# Patient Record
Sex: MALE | Race: WHITE | NOT HISPANIC OR LATINO | URBAN - METROPOLITAN AREA
[De-identification: names, ages, dates, MRNs, and addresses within clinical notes are randomized per-mention and may not be internally consistent; named-entity substitution may affect disease eponyms.]

---

## 2019-10-26 ENCOUNTER — INPATIENT (INPATIENT)
Facility: HOSPITAL | Age: 74
LOS: 1 days | Discharge: HOME | DRG: 66 | End: 2019-10-28
Attending: PSYCHIATRY & NEUROLOGY | Admitting: PSYCHIATRY & NEUROLOGY
Payer: MEDICARE

## 2019-10-26 VITALS
TEMPERATURE: 98 F | HEART RATE: 74 BPM | OXYGEN SATURATION: 96 % | RESPIRATION RATE: 18 BRPM | SYSTOLIC BLOOD PRESSURE: 161 MMHG | WEIGHT: 184.31 LBS | HEIGHT: 68 IN | DIASTOLIC BLOOD PRESSURE: 95 MMHG

## 2019-10-26 DIAGNOSIS — R63.8 OTHER SYMPTOMS AND SIGNS CONCERNING FOOD AND FLUID INTAKE: ICD-10-CM

## 2019-10-26 DIAGNOSIS — N40.0 BENIGN PROSTATIC HYPERPLASIA WITHOUT LOWER URINARY TRACT SYMPTOMS: ICD-10-CM

## 2019-10-26 DIAGNOSIS — Z91.89 OTHER SPECIFIED PERSONAL RISK FACTORS, NOT ELSEWHERE CLASSIFIED: ICD-10-CM

## 2019-10-26 DIAGNOSIS — I63.9 CEREBRAL INFARCTION, UNSPECIFIED: ICD-10-CM

## 2019-10-26 DIAGNOSIS — R73.03 PREDIABETES: ICD-10-CM

## 2019-10-26 LAB
ALBUMIN SERPL ELPH-MCNC: 4.3 G/DL — SIGNIFICANT CHANGE UP (ref 3.3–5)
ALP SERPL-CCNC: 45 U/L — SIGNIFICANT CHANGE UP (ref 40–120)
ALT FLD-CCNC: 33 U/L — SIGNIFICANT CHANGE UP (ref 10–45)
ANION GAP SERPL CALC-SCNC: 10 MMOL/L — SIGNIFICANT CHANGE UP (ref 5–17)
APPEARANCE UR: CLEAR — SIGNIFICANT CHANGE UP
APTT BLD: 30.1 SEC — SIGNIFICANT CHANGE UP (ref 27.5–36.3)
AST SERPL-CCNC: 25 U/L — SIGNIFICANT CHANGE UP (ref 10–40)
BASOPHILS # BLD AUTO: 0.04 K/UL — SIGNIFICANT CHANGE UP (ref 0–0.2)
BASOPHILS NFR BLD AUTO: 0.5 % — SIGNIFICANT CHANGE UP (ref 0–2)
BILIRUB SERPL-MCNC: 0.5 MG/DL — SIGNIFICANT CHANGE UP (ref 0.2–1.2)
BILIRUB UR-MCNC: NEGATIVE — SIGNIFICANT CHANGE UP
BUN SERPL-MCNC: 15 MG/DL — SIGNIFICANT CHANGE UP (ref 7–23)
CALCIUM SERPL-MCNC: 9.1 MG/DL — SIGNIFICANT CHANGE UP (ref 8.4–10.5)
CHLORIDE SERPL-SCNC: 104 MMOL/L — SIGNIFICANT CHANGE UP (ref 96–108)
CO2 SERPL-SCNC: 28 MMOL/L — SIGNIFICANT CHANGE UP (ref 22–31)
COLOR SPEC: YELLOW — SIGNIFICANT CHANGE UP
CREAT SERPL-MCNC: 1.06 MG/DL — SIGNIFICANT CHANGE UP (ref 0.5–1.3)
DIFF PNL FLD: ABNORMAL
EOSINOPHIL # BLD AUTO: 0.2 K/UL — SIGNIFICANT CHANGE UP (ref 0–0.5)
EOSINOPHIL NFR BLD AUTO: 2.3 % — SIGNIFICANT CHANGE UP (ref 0–6)
GLUCOSE SERPL-MCNC: 110 MG/DL — HIGH (ref 70–99)
GLUCOSE UR QL: NEGATIVE — SIGNIFICANT CHANGE UP
HCT VFR BLD CALC: 44 % — SIGNIFICANT CHANGE UP (ref 39–50)
HGB BLD-MCNC: 13.8 G/DL — SIGNIFICANT CHANGE UP (ref 13–17)
IMM GRANULOCYTES NFR BLD AUTO: 0.5 % — SIGNIFICANT CHANGE UP (ref 0–1.5)
INR BLD: 1.07 — SIGNIFICANT CHANGE UP (ref 0.88–1.16)
KETONES UR-MCNC: NEGATIVE — SIGNIFICANT CHANGE UP
LEUKOCYTE ESTERASE UR-ACNC: NEGATIVE — SIGNIFICANT CHANGE UP
LYMPHOCYTES # BLD AUTO: 2.3 K/UL — SIGNIFICANT CHANGE UP (ref 1–3.3)
LYMPHOCYTES # BLD AUTO: 26.2 % — SIGNIFICANT CHANGE UP (ref 13–44)
MCHC RBC-ENTMCNC: 31.2 PG — SIGNIFICANT CHANGE UP (ref 27–34)
MCHC RBC-ENTMCNC: 31.4 GM/DL — LOW (ref 32–36)
MCV RBC AUTO: 99.3 FL — SIGNIFICANT CHANGE UP (ref 80–100)
MONOCYTES # BLD AUTO: 0.75 K/UL — SIGNIFICANT CHANGE UP (ref 0–0.9)
MONOCYTES NFR BLD AUTO: 8.6 % — SIGNIFICANT CHANGE UP (ref 2–14)
NEUTROPHILS # BLD AUTO: 5.44 K/UL — SIGNIFICANT CHANGE UP (ref 1.8–7.4)
NEUTROPHILS NFR BLD AUTO: 61.9 % — SIGNIFICANT CHANGE UP (ref 43–77)
NITRITE UR-MCNC: NEGATIVE — SIGNIFICANT CHANGE UP
NRBC # BLD: 0 /100 WBCS — SIGNIFICANT CHANGE UP (ref 0–0)
PH UR: 6.5 — SIGNIFICANT CHANGE UP (ref 5–8)
PLATELET # BLD AUTO: 234 K/UL — SIGNIFICANT CHANGE UP (ref 150–400)
POTASSIUM SERPL-MCNC: 4 MMOL/L — SIGNIFICANT CHANGE UP (ref 3.5–5.3)
POTASSIUM SERPL-SCNC: 4 MMOL/L — SIGNIFICANT CHANGE UP (ref 3.5–5.3)
PROT SERPL-MCNC: 7.1 G/DL — SIGNIFICANT CHANGE UP (ref 6–8.3)
PROT UR-MCNC: NEGATIVE MG/DL — SIGNIFICANT CHANGE UP
PROTHROM AB SERPL-ACNC: 12.1 SEC — SIGNIFICANT CHANGE UP (ref 10–12.9)
RBC # BLD: 4.43 M/UL — SIGNIFICANT CHANGE UP (ref 4.2–5.8)
RBC # FLD: 12.5 % — SIGNIFICANT CHANGE UP (ref 10.3–14.5)
SODIUM SERPL-SCNC: 142 MMOL/L — SIGNIFICANT CHANGE UP (ref 135–145)
SP GR SPEC: 1.01 — SIGNIFICANT CHANGE UP (ref 1–1.03)
TROPONIN T SERPL-MCNC: <0.01 NG/ML — SIGNIFICANT CHANGE UP (ref 0–0.01)
UROBILINOGEN FLD QL: 0.2 E.U./DL — SIGNIFICANT CHANGE UP
WBC # BLD: 8.77 K/UL — SIGNIFICANT CHANGE UP (ref 3.8–10.5)
WBC # FLD AUTO: 8.77 K/UL — SIGNIFICANT CHANGE UP (ref 3.8–10.5)

## 2019-10-26 PROCEDURE — 99291 CRITICAL CARE FIRST HOUR: CPT

## 2019-10-26 PROCEDURE — 70496 CT ANGIOGRAPHY HEAD: CPT | Mod: 26

## 2019-10-26 PROCEDURE — 99223 1ST HOSP IP/OBS HIGH 75: CPT

## 2019-10-26 PROCEDURE — 70498 CT ANGIOGRAPHY NECK: CPT | Mod: 26

## 2019-10-26 PROCEDURE — 70450 CT HEAD/BRAIN W/O DYE: CPT | Mod: 26,59

## 2019-10-26 PROCEDURE — 0042T: CPT

## 2019-10-26 PROCEDURE — 93010 ELECTROCARDIOGRAM REPORT: CPT

## 2019-10-26 RX ORDER — ASPIRIN/CALCIUM CARB/MAGNESIUM 324 MG
81 TABLET ORAL DAILY
Refills: 0 | Status: DISCONTINUED | OUTPATIENT
Start: 2019-10-26 | End: 2019-10-28

## 2019-10-26 RX ORDER — TAMSULOSIN HYDROCHLORIDE 0.4 MG/1
1 CAPSULE ORAL
Qty: 0 | Refills: 0 | DISCHARGE

## 2019-10-26 RX ORDER — ENOXAPARIN SODIUM 100 MG/ML
40 INJECTION SUBCUTANEOUS EVERY 24 HOURS
Refills: 0 | Status: DISCONTINUED | OUTPATIENT
Start: 2019-10-26 | End: 2019-10-28

## 2019-10-26 RX ORDER — FINASTERIDE 5 MG/1
1 TABLET, FILM COATED ORAL
Qty: 0 | Refills: 0 | DISCHARGE

## 2019-10-26 RX ORDER — PANTOPRAZOLE SODIUM 20 MG/1
40 TABLET, DELAYED RELEASE ORAL
Refills: 0 | Status: DISCONTINUED | OUTPATIENT
Start: 2019-10-26 | End: 2019-10-28

## 2019-10-26 RX ORDER — INFLUENZA VIRUS VACCINE 15; 15; 15; 15 UG/.5ML; UG/.5ML; UG/.5ML; UG/.5ML
0.5 SUSPENSION INTRAMUSCULAR ONCE
Refills: 0 | Status: COMPLETED | OUTPATIENT
Start: 2019-10-26 | End: 2019-10-28

## 2019-10-26 RX ORDER — CLOPIDOGREL BISULFATE 75 MG/1
75 TABLET, FILM COATED ORAL ONCE
Refills: 0 | Status: COMPLETED | OUTPATIENT
Start: 2019-10-26 | End: 2019-10-26

## 2019-10-26 RX ORDER — OMEPRAZOLE 10 MG/1
1 CAPSULE, DELAYED RELEASE ORAL
Qty: 0 | Refills: 0 | DISCHARGE

## 2019-10-26 RX ORDER — ATORVASTATIN CALCIUM 80 MG/1
40 TABLET, FILM COATED ORAL AT BEDTIME
Refills: 0 | Status: DISCONTINUED | OUTPATIENT
Start: 2019-10-26 | End: 2019-10-27

## 2019-10-26 RX ORDER — TAMSULOSIN HYDROCHLORIDE 0.4 MG/1
0.4 CAPSULE ORAL AT BEDTIME
Refills: 0 | Status: DISCONTINUED | OUTPATIENT
Start: 2019-10-26 | End: 2019-10-28

## 2019-10-26 RX ORDER — FINASTERIDE 5 MG/1
5 TABLET, FILM COATED ORAL DAILY
Refills: 0 | Status: DISCONTINUED | OUTPATIENT
Start: 2019-10-26 | End: 2019-10-27

## 2019-10-26 RX ORDER — TAMSULOSIN HYDROCHLORIDE 0.4 MG/1
0 CAPSULE ORAL
Qty: 0 | Refills: 0 | DISCHARGE

## 2019-10-26 RX ADMIN — CLOPIDOGREL BISULFATE 75 MILLIGRAM(S): 75 TABLET, FILM COATED ORAL at 14:52

## 2019-10-26 RX ADMIN — Medication 81 MILLIGRAM(S): at 14:52

## 2019-10-26 RX ADMIN — ATORVASTATIN CALCIUM 40 MILLIGRAM(S): 80 TABLET, FILM COATED ORAL at 21:19

## 2019-10-26 RX ADMIN — TAMSULOSIN HYDROCHLORIDE 0.4 MILLIGRAM(S): 0.4 CAPSULE ORAL at 21:19

## 2019-10-26 NOTE — PATIENT PROFILE ADULT - STATED REASON FOR ADMISSION
Woke up this morning, around 10:30 am noticed a numbness on L side of head that radiated to neck. Numbness was also present to L big toe

## 2019-10-26 NOTE — ED PROVIDER NOTE - PHYSICAL EXAMINATION
CONSTITUTIONAL: Well appearing, well nourished, awake, alert and in no apparent distress.  HEENT: Head is atraumatic. Eyes clear bilaterally, normal EOMI. Airway patent.  CARDIAC: Normal rate, regular rhythm.  Heart sounds S1, S2.   RESPIRATORY: Breath sounds clear and equal bilaterally. no tachypnea, respiratory distress.   GASTROINTESTINAL: Abdomen soft, non-tender, no guarding, distension.  MUSCULOSKELETAL: Spine appears normal, no midline spinal tenderness, range of motion is not limited, no muscle or joint tenderness. no bony tenderness. no JVD, peripheral edema.   NEUROLOGICAL: Alert and oriented  SKIN: Skin normal color for race, warm, dry and intact. No evidence of rash.  PSYCHIATRIC: Alert and oriented to person, place, time/situation. normal mood and affect. no apparent risk to self or others.

## 2019-10-26 NOTE — H&P ADULT - NSHPLABSRESULTS_GEN_ALL_CORE
.  LABS:                         13.8   8.77  )-----------( 234      ( 26 Oct 2019 12:17 )             44.0     10-26    142  |  104  |  15  ----------------------------<  110<H>  4.0   |  28  |  1.06    Ca    9.1      26 Oct 2019 12:17    TPro  7.1  /  Alb  4.3  /  TBili  0.5  /  DBili  x   /  AST  25  /  ALT  33  /  AlkPhos  45  10    PT/INR - ( 26 Oct 2019 12:17 )   PT: 12.1 sec;   INR: 1.07          PTT - ( 26 Oct 2019 12:17 )  PTT:30.1 sec  Urinalysis Basic - ( 26 Oct 2019 13:00 )    Color: Yellow / Appearance: Clear / S.010 / pH: x  Gluc: x / Ketone: NEGATIVE  / Bili: Negative / Urobili: 0.2 E.U./dL   Blood: x / Protein: NEGATIVE mg/dL / Nitrite: NEGATIVE   Leuk Esterase: NEGATIVE / RBC: 5-10 /HPF / WBC < 5 /HPF   Sq Epi: x / Non Sq Epi: 0-5 /HPF / Bacteria: None /HPF      CARDIAC MARKERS ( 26 Oct 2019 12:17 )  x     / <0.01 ng/mL / x     / x     / x                RADIOLOGY, EKG & ADDITIONAL TESTS: Reviewed.

## 2019-10-26 NOTE — H&P ADULT - HISTORY OF PRESENT ILLNESS
73 y/o M w/ hx of BPH and FH of strokes coming in with complaints of L sided ear numbness. He states that starting around 10:30am when he went to answer his phone, he noticed his ear was numb. He then also felt as if his L great toe was numb as well. He denied weakness, dizziness/lightheadedness, HA, CP, palpitations, SOB, abdominal pain, N/V/D/C, dysuria/hematuria. On admission, vitals T-97.6, HR-74, BP-161/95, RR-18, SpO2-96% on RA. Stroke code was called. NIHSS 1 for sensation deficits (patient noting sensation deficits on L side of face V1-V3 and L leg 85% of normal). CTH, CTA head and neck and CTP negative for acute infarct, hemorrhage or occlusion. CT neck w/ multilevel cervical spondylosis.

## 2019-10-26 NOTE — CONSULT NOTE ADULT - SUBJECTIVE AND OBJECTIVE BOX
**STROKE CODE CONSULT NOTE**    Last known well time/Time of onset of symptoms:    HPI:    PAST MEDICAL & SURGICAL HISTORY:  BPH (benign prostatic hyperplasia)      FAMILY HISTORY:      SOCIAL HISTORY:  Smoking Cesation: Discussed    ROS:  Constitutional: No fever, weight loss or fatigue  Eyes: No eye pain, visual disturbances, or discharge  ENMT:  No difficulty hearing, tinnitus, vertigo; No sinus or throat pain  Neck: No pain or stiffness  Respiratory: No cough, wheezing, chills or hemoptysis  Cardiovascular: No chest pain, palpitations, shortness of breath, dizziness or leg swelling  Gastrointestinal: No abdominal pain. No nausea, vomiting or hematemesis; No diarrhea or constipation. Nohematochezia.  Genitourinary: No dysuria, frequency, hematuria or incontinence  Neurological: As per HPI  Skin: No itching, burning, rashes or lesions   Endocrine: No heat or cold intolerance; No hair loss  Musculoskeletal: No joint pain or swelling; No muscle, back or extremity pain  Psychiatric: No depression, anxiety, mood swings or difficulty sleeping  Heme/Lymph: No easy bruising or bleeding gums    MEDICATIONS  (STANDING):  aspirin enteric coated 81 milliGRAM(s) Oral daily  atorvastatin 40 milliGRAM(s) Oral at bedtime  enoxaparin Injectable 40 milliGRAM(s) SubCutaneous every 24 hours  finasteride 5 milliGRAM(s) Oral daily  influenza   Vaccine 0.5 milliLiter(s) IntraMuscular once  pantoprazole    Tablet 40 milliGRAM(s) Oral before breakfast  tamsulosin 0.4 milliGRAM(s) Oral at bedtime    MEDICATIONS  (PRN):      Allergies    sulfa drugs (Other)    Intolerances    lactose (Diarrhea)      Vital Signs Last 24 Hrs  T(C): 36.8 (26 Oct 2019 18:00), Max: 36.8 (26 Oct 2019 18:00)  T(F): 98.3 (26 Oct 2019 18:00), Max: 98.3 (26 Oct 2019 18:00)  HR: 64 (26 Oct 2019 16:30) (54 - 74)  BP: 152/82 (26 Oct 2019 16:30) (126/82 - 161/95)  BP(mean): 111 (26 Oct 2019 16:30) (111 - 111)  RR: 16 (26 Oct 2019 16:30) (16 - 18)  SpO2: 95% (26 Oct 2019 16:30) (95% - 97%)    PHYSICAL EXAM:  Constitutional: WDWN; NAD  Cardiovascular: RRR, no appreciable murmurs; no carotid bruits  Neurologic:  Mental status: Awake, alert and oriented x3.  Recent and remote memory intact.  Naming, repetition and comprehension intact.  Attention/concentration intact.  No dysarthria, no aphasia.  Fund of knowledge appropriate.    Cranial nerves: Pupils equally round and reactive to light, visual fields full, no nystagmus, extraocular muscles intact, V1 through V3 intact bilaterally and symmetric, face symmetric, hearing intact to finger rub, palate elevation symmetric, tongue was midline, sternocleidomastoid/shoulder shrug strength bilaterally 5/5.    Motor:  Normal bulk and tone, strength 5/5 in bilateral upper and lower extremities.   strength 5/5.  Rapid alternating movements intact and symmetric.   Sensation: Intact to light touch, proprioception, and pinprick.  No neglect.   Coordination: No dysmetria on finger-to-nose and heel-to-shin.  No clumsiness.  Reflexes: 2+ in upper and lower extremities, downgoing toes bilaterally  Gait: Narrow and steady. No ataxia.  Romberg negative    NIHSS:    Fingerstick Blood Glucose: CAPILLARY BLOOD GLUCOSE  109 (26 Oct 2019 13:24)      POCT Blood Glucose.: 109 mg/dL (26 Oct 2019 11:33)       LABS:                        13.8   8.77  )-----------( 234      ( 26 Oct 2019 12:17 )             44.0     10-26    142  |  104  |  15  ----------------------------<  110<H>  4.0   |  28  |  1.06    Ca    9.1      26 Oct 2019 12:17    TPro  7.1  /  Alb  4.3  /  TBili  0.5  /  DBili  x   /  AST  25  /  ALT  33  /  AlkPhos  45  10    PT/INR - ( 26 Oct 2019 12:17 )   PT: 12.1 sec;   INR: 1.07          PTT - ( 26 Oct 2019 12:17 )  PTT:30.1 sec  CARDIAC MARKERS ( 26 Oct 2019 12:17 )  x     / <0.01 ng/mL / x     / x     / x          Urinalysis Basic - ( 26 Oct 2019 13:00 )    Color: Yellow / Appearance: Clear / S.010 / pH: x  Gluc: x / Ketone: NEGATIVE  / Bili: Negative / Urobili: 0.2 E.U./dL   Blood: x / Protein: NEGATIVE mg/dL / Nitrite: NEGATIVE   Leuk Esterase: NEGATIVE / RBC: 5-10 /HPF / WBC < 5 /HPF   Sq Epi: x / Non Sq Epi: 0-5 /HPF / Bacteria: None /HPF        RADIOLOGY & ADDITIONAL STUDIES:    IV-tPA (Y/N):                                   Bolus time:  Reason IV-tPA not given:    ASSESSMENT/PLAN: **STROKE CODE CONSULT NOTE**    Last known well time/Time of onset of symptoms: 10:30     HPI:  75 y/o M w/ hx of BPH and FH of strokes coming in with complaints of     PAST MEDICAL & SURGICAL HISTORY:  BPH (benign prostatic hyperplasia)      FAMILY HISTORY:      SOCIAL HISTORY:  Smoking Cesation: Discussed    ROS:  Constitutional: No fever, weight loss or fatigue  Eyes: No eye pain, visual disturbances, or discharge  ENMT:  No difficulty hearing, tinnitus, vertigo; No sinus or throat pain  Neck: No pain or stiffness  Respiratory: No cough, wheezing, chills or hemoptysis  Cardiovascular: No chest pain, palpitations, shortness of breath, dizziness or leg swelling  Gastrointestinal: No abdominal pain. No nausea, vomiting or hematemesis; No diarrhea or constipation. Nohematochezia.  Genitourinary: No dysuria, frequency, hematuria or incontinence  Neurological: As per HPI  Skin: No itching, burning, rashes or lesions   Endocrine: No heat or cold intolerance; No hair loss  Musculoskeletal: No joint pain or swelling; No muscle, back or extremity pain  Psychiatric: No depression, anxiety, mood swings or difficulty sleeping  Heme/Lymph: No easy bruising or bleeding gums    MEDICATIONS  (STANDING):  aspirin enteric coated 81 milliGRAM(s) Oral daily  atorvastatin 40 milliGRAM(s) Oral at bedtime  enoxaparin Injectable 40 milliGRAM(s) SubCutaneous every 24 hours  finasteride 5 milliGRAM(s) Oral daily  influenza   Vaccine 0.5 milliLiter(s) IntraMuscular once  pantoprazole    Tablet 40 milliGRAM(s) Oral before breakfast  tamsulosin 0.4 milliGRAM(s) Oral at bedtime    MEDICATIONS  (PRN):      Allergies    sulfa drugs (Other)    Intolerances    lactose (Diarrhea)      Vital Signs Last 24 Hrs  T(C): 36.8 (26 Oct 2019 18:00), Max: 36.8 (26 Oct 2019 18:00)  T(F): 98.3 (26 Oct 2019 18:00), Max: 98.3 (26 Oct 2019 18:00)  HR: 64 (26 Oct 2019 16:30) (54 - 74)  BP: 152/82 (26 Oct 2019 16:30) (126/82 - 161/95)  BP(mean): 111 (26 Oct 2019 16:30) (111 - 111)  RR: 16 (26 Oct 2019 16:30) (16 - 18)  SpO2: 95% (26 Oct 2019 16:30) (95% - 97%)    PHYSICAL EXAM:  Constitutional: WDWN; NAD  Cardiovascular: RRR, no appreciable murmurs; no carotid bruits  Neurologic:  Mental status: Awake, alert and oriented x3.  Recent and remote memory intact.  Naming, repetition and comprehension intact.  Attention/concentration intact.  No dysarthria, no aphasia.  Fund of knowledge appropriate.    Cranial nerves: Pupils equally round and reactive to light, visual fields full, no nystagmus, extraocular muscles intact, V1 through V3 intact bilaterally and symmetric, face symmetric, hearing intact to finger rub, palate elevation symmetric, tongue was midline, sternocleidomastoid/shoulder shrug strength bilaterally 5/5.    Motor:  Normal bulk and tone, strength 5/5 in bilateral upper and lower extremities.   strength 5/5.  Rapid alternating movements intact and symmetric.   Sensation: Intact to light touch, proprioception, and pinprick.  No neglect.   Coordination: No dysmetria on finger-to-nose and heel-to-shin.  No clumsiness.  Reflexes: 2+ in upper and lower extremities, downgoing toes bilaterally  Gait: Narrow and steady. No ataxia.  Romberg negative    NIHSS:    Fingerstick Blood Glucose: CAPILLARY BLOOD GLUCOSE  109 (26 Oct 2019 13:24)      POCT Blood Glucose.: 109 mg/dL (26 Oct 2019 11:33)       LABS:                        13.8   8.77  )-----------( 234      ( 26 Oct 2019 12:17 )             44.0     10-26    142  |  104  |  15  ----------------------------<  110<H>  4.0   |  28  |  1.06    Ca    9.1      26 Oct 2019 12:17    TPro  7.1  /  Alb  4.3  /  TBili  0.5  /  DBili  x   /  AST  25  /  ALT  33  /  AlkPhos  45  10    PT/INR - ( 26 Oct 2019 12:17 )   PT: 12.1 sec;   INR: 1.07          PTT - ( 26 Oct 2019 12:17 )  PTT:30.1 sec  CARDIAC MARKERS ( 26 Oct 2019 12:17 )  x     / <0.01 ng/mL / x     / x     / x          Urinalysis Basic - ( 26 Oct 2019 13:00 )    Color: Yellow / Appearance: Clear / S.010 / pH: x  Gluc: x / Ketone: NEGATIVE  / Bili: Negative / Urobili: 0.2 E.U./dL   Blood: x / Protein: NEGATIVE mg/dL / Nitrite: NEGATIVE   Leuk Esterase: NEGATIVE / RBC: 5-10 /HPF / WBC < 5 /HPF   Sq Epi: x / Non Sq Epi: 0-5 /HPF / Bacteria: None /HPF        RADIOLOGY & ADDITIONAL STUDIES:    IV-tPA (Y/N):                                   Bolus time:  Reason IV-tPA not given:    ASSESSMENT/PLAN: **STROKE CODE CONSULT NOTE**    Last known well time/Time of onset of symptoms: 10:30     HPI:  75 y/o M w/ hx of BPH and FH of strokes coming in with complaints of L sided ear numbness. He states that starting around 10:30am when he went to answer his phone, he noticed his ear was numb. He then also felt as if his L great toe was numb as well. He denied weakness, dizziness/lightheadedness, HA, CP, palpitations, SOB, abdominal pain, N/V/D/C, dysuria/hematuria. On admission, vitals T-97.6, HR-74, BP-161/95, RR-18, SpO2-96% on RA. Stroke code was called. NIHSS 1 for sensation deficits (patient noting sensation deficits on L side of face V1-V3 and L leg 85% of normal). CTH, CTA head and neck and CTP negative for acute infarct, hemorrhage or occlusion. CT neck w/ multilevel cervical spondylosis.     PAST MEDICAL & SURGICAL HISTORY:  BPH (benign prostatic hyperplasia)      FAMILY HISTORY:  CVA in grandparents    SOCIAL HISTORY:  Denies smoking, drinking or recreational drug use    ROS:  Constitutional: No fever, weight loss or fatigue  Eyes: No eye pain, visual disturbances, or discharge  ENMT:  No difficulty hearing, tinnitus, vertigo; No sinus or throat pain  Neck: No pain or stiffness  Respiratory: No cough, wheezing, chills or hemoptysis  Cardiovascular: No chest pain, palpitations, shortness of breath, dizziness or leg swelling  Gastrointestinal: No abdominal pain. No nausea, vomiting or hematemesis; No diarrhea or constipation. Nohematochezia.  Genitourinary: No dysuria, frequency, hematuria or incontinence  Neurological: As per HPI  Skin: No itching, burning, rashes or lesions   Endocrine: No heat or cold intolerance; No hair loss  Musculoskeletal: No joint pain or swelling; No muscle, back or extremity pain  Psychiatric: No depression, anxiety, mood swings or difficulty sleeping  Heme/Lymph: No easy bruising or bleeding gums    MEDICATIONS  (STANDING):  aspirin enteric coated 81 milliGRAM(s) Oral daily  atorvastatin 40 milliGRAM(s) Oral at bedtime  enoxaparin Injectable 40 milliGRAM(s) SubCutaneous every 24 hours  finasteride 5 milliGRAM(s) Oral daily  influenza   Vaccine 0.5 milliLiter(s) IntraMuscular once  pantoprazole    Tablet 40 milliGRAM(s) Oral before breakfast  tamsulosin 0.4 milliGRAM(s) Oral at bedtime    MEDICATIONS  (PRN):      Allergies    sulfa drugs (Other)    Intolerances    lactose (Diarrhea)      Vital Signs Last 24 Hrs  T(C): 36.8 (26 Oct 2019 18:00), Max: 36.8 (26 Oct 2019 18:00)  T(F): 98.3 (26 Oct 2019 18:00), Max: 98.3 (26 Oct 2019 18:00)  HR: 64 (26 Oct 2019 16:30) (54 - 74)  BP: 152/82 (26 Oct 2019 16:30) (126/82 - 161/95)  BP(mean): 111 (26 Oct 2019 16:30) (111 - 111)  RR: 16 (26 Oct 2019 16:30) (16 - 18)  SpO2: 95% (26 Oct 2019 16:30) (95% - 97%)    PHYSICAL EXAM:  Constitutional: WDWN; NAD  Cardiovascular: RRR, no appreciable murmurs; no carotid bruits  Ear exam: cerumen impaction B/L   Neurologic:  Mental status: Awake, alert and oriented x3.  Recent and remote memory intact.  Naming, repetition and comprehension intact.  Attention/concentration intact.  No dysarthria, no aphasia.  Fund of knowledge appropriate.    Cranial nerves: Pupils equally round and reactive to light, visual fields full, no nystagmus, extraocular muscles intact, V1 through V3 on L decreased sensation to touch, face symmetric, hearing intact to finger rub, palate elevation symmetric, tongue was midline, sternocleidomastoid/shoulder shrug strength bilaterally 5/5.    Motor:  Normal bulk and tone, strength 5/5 in bilateral upper and lower extremities.   strength 5/5.  Rapid alternating movements intact and symmetric.   Sensation: Intact to light touch, proprioception, and pinprick.  No neglect.   Coordination: No dysmetria on finger-to-nose and heel-to-shin.  No clumsiness.  Reflexes: 2+ in upper and lower extremities, downgoing toes bilaterally  Gait: Narrow and steady. No ataxia.  Romberg negative    NIHSS:    Fingerstick Blood Glucose: CAPILLARY BLOOD GLUCOSE  109 (26 Oct 2019 13:24)      POCT Blood Glucose.: 109 mg/dL (26 Oct 2019 11:33)       LABS:                        13.8   8.77  )-----------( 234      ( 26 Oct 2019 12:17 )             44.0     10-26    142  |  104  |  15  ----------------------------<  110<H>  4.0   |  28  |  1.06    Ca    9.1      26 Oct 2019 12:17    TPro  7.1  /  Alb  4.3  /  TBili  0.5  /  DBili  x   /  AST  25  /  ALT  33  /  AlkPhos  45  10-26    PT/INR - ( 26 Oct 2019 12:17 )   PT: 12.1 sec;   INR: 1.07          PTT - ( 26 Oct 2019 12:17 )  PTT:30.1 sec  CARDIAC MARKERS ( 26 Oct 2019 12:17 )  x     / <0.01 ng/mL / x     / x     / x          Urinalysis Basic - ( 26 Oct 2019 13:00 )    Color: Yellow / Appearance: Clear / S.010 / pH: x  Gluc: x / Ketone: NEGATIVE  / Bili: Negative / Urobili: 0.2 E.U./dL   Blood: x / Protein: NEGATIVE mg/dL / Nitrite: NEGATIVE   Leuk Esterase: NEGATIVE / RBC: 5-10 /HPF / WBC < 5 /HPF   Sq Epi: x / Non Sq Epi: 0-5 /HPF / Bacteria: None /HPF        RADIOLOGY & ADDITIONAL STUDIES:    IV-tPA (Y/N): No                                  Bolus time:  Reason IV-tPA not given: No obvious ischemia or LVO

## 2019-10-26 NOTE — ED PROVIDER NOTE - CLINICAL SUMMARY MEDICAL DECISION MAKING FREE TEXT BOX
pt w sx as above, concerning for ?cva vs peripheral neuropathy, given time onset, stroke code called. per pt sx in foot resolving while in ED.

## 2019-10-26 NOTE — H&P ADULT - PROBLEM SELECTOR PLAN 4
1) PCP Contacted on Admission: (Y/N) --> Name & Phone #:  2) Date of Contact with PCP:  3) PCP Contacted at Discharge: (Y/N, N/A)  4) Summary of Handoff Given to PCP:   5) Post-Discharge Appointment Date and Location: replete lytes as needed  lovenox 40 mg  diet dash tlc

## 2019-10-26 NOTE — ED ADULT NURSE NOTE - NSIMPLEMENTINTERV_GEN_ALL_ED
Implemented All Universal Safety Interventions:  [place] to call system. Call bell, personal items and telephone within reach. Instruct patient to call for assistance. Room bathroom lighting operational. Non-slip footwear when patient is off stretcher. Physically safe environment: no spills, clutter or unnecessary equipment. Stretcher in lowest position, wheels locked, appropriate side rails in place.

## 2019-10-26 NOTE — ED PROVIDER NOTE - CHPI ED SYMPTOMS NEG
no confusion/no change in level of consciousness/no vomiting/no blurred vision/no loss of consciousness/no weakness/no fever/no nausea/no numbness

## 2019-10-26 NOTE — H&P ADULT - PROBLEM SELECTOR PLAN 1
-presented with left ear numbess and numbness in left big toe (which resolved)  CT non con-No acute intracranial hemorrhage or evidence of acute territorial   infarction.  CTP-normal  CTA-No extracranial large vessel occlusion, high-grade stenosis, or evidence   of dissection.  - Closely follow neuro checks every 2-4 hours   - Repeat HCT for acute changes in neuro exam  - MRI Brain without contrast  - Obtain TTE  - Goal -180  - PT/OT/SLP   - Obtain Hemoglobin A1C, Lipid Profile Panel, and TSH  - asp 81 mg  -plavix 75 mg  - lipitor 40 mg  -lovenox 40 mg  -SCDs -presented with left ear numbness and numbness in left big toe (which resolved). Found to have 85% sensation in V1-V3 and L leg  CT non con-No acute intracranial hemorrhage or evidence of acute territorial   infarction.  CTP-normal  CTA-No extracranial large vessel occlusion, high-grade stenosis, or evidence   of dissection.  - Closely follow neuro checks every 2-4 hours   - Repeat HCT for acute changes in neuro exam  - MRI Brain without contrast  - Obtain TTE  - Goal -180  - PT/OT/SLP   - Obtain Hemoglobin A1C, Lipid Profile Panel, and TSH  - asp 81 mg  -plavix 75 mg  - lipitor 40 mg  -lovenox 40 mg  -SCDs

## 2019-10-26 NOTE — ED PROVIDER NOTE - OBJECTIVE STATEMENT
75 yo hx of bph w left ear numbness and L toe numbness at 1030a prior to arrival. no prior hx of strokes, DM, htn. no chest pain, sob, speech difficulties or any other complaints.

## 2019-10-26 NOTE — H&P ADULT - NSHPPHYSICALEXAM_GEN_ALL_CORE
.  VITAL SIGNS:  T(C): 36.8 (10-26-19 @ 18:00), Max: 36.8 (10-26-19 @ 18:00)  T(F): 98.3 (10-26-19 @ 18:00), Max: 98.3 (10-26-19 @ 18:00)  HR: 64 (10-26-19 @ 16:30) (54 - 74)  BP: 152/82 (10-26-19 @ 16:30) (126/82 - 161/95)  BP(mean): 111 (10-26-19 @ 16:30) (111 - 111)  RR: 16 (10-26-19 @ 16:30) (16 - 18)  SpO2: 95% (10-26-19 @ 16:30) (95% - 97%)  Wt(kg): --    PHYSICAL EXAM:    Constitutional: WDWN; NAD  Cardiovascular: RRR, no appreciable murmurs; no carotid bruits  Ear exam: cerumen impaction B/L   Neurologic:  Mental status: Awake, alert and oriented x3.  Recent and remote memory intact.  Naming, repetition and comprehension intact.  Attention/concentration intact.  No dysarthria, no aphasia.  Fund of knowledge appropriate.    Cranial nerves: Pupils equally round and reactive to light, visual fields full, no nystagmus, extraocular muscles intact, V1 through V3 on L decreased sensation to touch, face symmetric, hearing intact to finger rub, palate elevation symmetric, tongue was midline, sternocleidomastoid/shoulder shrug strength bilaterally 5/5.    Motor:  Normal bulk and tone, strength 5/5 in bilateral upper and lower extremities.   strength 5/5.  Rapid alternating movements intact and symmetric.   Sensation: Intact to light touch, proprioception, and pinprick.  No neglect.   Coordination: No dysmetria on finger-to-nose and heel-to-shin.  No clumsiness.  Reflexes: 2+ in upper and lower extremities, downgoing toes bilaterally  Gait: Narrow and steady. No ataxia.  Romberg negative

## 2019-10-26 NOTE — H&P ADULT - ASSESSMENT
73 y/o M w/ hx of BPH and FH of strokes coming in with complaints of L sided ear numbness found to have 85% sensation of L V1-V3 and LLE

## 2019-10-26 NOTE — ED ADULT NURSE NOTE - OBJECTIVE STATEMENT
Pt is a 73 y/o male c/o numbness to left ear and toes staring 10:30 am. Pt reports history BPH, on Flomax. No facial droop noted, A&Ox4, steady gait, PEARRL, strength equal bilaterally.  Diminished sensation to left side of face. Denies cardiac hx.

## 2019-10-26 NOTE — ED ADULT TRIAGE NOTE - CHIEF COMPLAINT QUOTE
Patient c/o left ear , arm , leg and toes numbness since 10:30am today . No slurred speech , facial droop ,arm drift , dizziness nor unsteady gait noted ,.

## 2019-10-26 NOTE — H&P ADULT - PROBLEM SELECTOR PLAN 3
replete lytes as needed  lovenox 40 mg  diet dash tlc A1c 6.0%  - Nutrition consult to discuss diet and exercise

## 2019-10-27 ENCOUNTER — TRANSCRIPTION ENCOUNTER (OUTPATIENT)
Age: 74
End: 2019-10-27

## 2019-10-27 LAB
ANION GAP SERPL CALC-SCNC: 12 MMOL/L — SIGNIFICANT CHANGE UP (ref 5–17)
BUN SERPL-MCNC: 18 MG/DL — SIGNIFICANT CHANGE UP (ref 7–23)
CALCIUM SERPL-MCNC: 8.7 MG/DL — SIGNIFICANT CHANGE UP (ref 8.4–10.5)
CHLORIDE SERPL-SCNC: 102 MMOL/L — SIGNIFICANT CHANGE UP (ref 96–108)
CO2 SERPL-SCNC: 24 MMOL/L — SIGNIFICANT CHANGE UP (ref 22–31)
CREAT SERPL-MCNC: 1.06 MG/DL — SIGNIFICANT CHANGE UP (ref 0.5–1.3)
GLUCOSE SERPL-MCNC: 108 MG/DL — HIGH (ref 70–99)
HCT VFR BLD CALC: 37.7 % — LOW (ref 39–50)
HCV AB S/CO SERPL IA: 0.78 S/CO — SIGNIFICANT CHANGE UP
HCV AB SERPL-IMP: SIGNIFICANT CHANGE UP
HGB BLD-MCNC: 12.3 G/DL — LOW (ref 13–17)
MAGNESIUM SERPL-MCNC: 1.7 MG/DL — SIGNIFICANT CHANGE UP (ref 1.6–2.6)
MCHC RBC-ENTMCNC: 31.5 PG — SIGNIFICANT CHANGE UP (ref 27–34)
MCHC RBC-ENTMCNC: 32.6 GM/DL — SIGNIFICANT CHANGE UP (ref 32–36)
MCV RBC AUTO: 96.7 FL — SIGNIFICANT CHANGE UP (ref 80–100)
NRBC # BLD: 0 /100 WBCS — SIGNIFICANT CHANGE UP (ref 0–0)
PLATELET # BLD AUTO: 192 K/UL — SIGNIFICANT CHANGE UP (ref 150–400)
POTASSIUM SERPL-MCNC: 3.9 MMOL/L — SIGNIFICANT CHANGE UP (ref 3.5–5.3)
POTASSIUM SERPL-SCNC: 3.9 MMOL/L — SIGNIFICANT CHANGE UP (ref 3.5–5.3)
RBC # BLD: 3.9 M/UL — LOW (ref 4.2–5.8)
RBC # FLD: 12.5 % — SIGNIFICANT CHANGE UP (ref 10.3–14.5)
SODIUM SERPL-SCNC: 138 MMOL/L — SIGNIFICANT CHANGE UP (ref 135–145)
TSH SERPL-MCNC: 1.96 UIU/ML — SIGNIFICANT CHANGE UP (ref 0.35–4.94)
WBC # BLD: 6.78 K/UL — SIGNIFICANT CHANGE UP (ref 3.8–10.5)
WBC # FLD AUTO: 6.78 K/UL — SIGNIFICANT CHANGE UP (ref 3.8–10.5)

## 2019-10-27 PROCEDURE — 70551 MRI BRAIN STEM W/O DYE: CPT | Mod: 26

## 2019-10-27 PROCEDURE — 99233 SBSQ HOSP IP/OBS HIGH 50: CPT

## 2019-10-27 RX ORDER — ATORVASTATIN CALCIUM 80 MG/1
80 TABLET, FILM COATED ORAL AT BEDTIME
Refills: 0 | Status: DISCONTINUED | OUTPATIENT
Start: 2019-10-27 | End: 2019-10-28

## 2019-10-27 RX ORDER — MAGNESIUM SULFATE 500 MG/ML
2 VIAL (ML) INJECTION ONCE
Refills: 0 | Status: COMPLETED | OUTPATIENT
Start: 2019-10-27 | End: 2019-10-27

## 2019-10-27 RX ADMIN — PANTOPRAZOLE SODIUM 40 MILLIGRAM(S): 20 TABLET, DELAYED RELEASE ORAL at 06:49

## 2019-10-27 RX ADMIN — Medication 81 MILLIGRAM(S): at 11:59

## 2019-10-27 RX ADMIN — ATORVASTATIN CALCIUM 80 MILLIGRAM(S): 80 TABLET, FILM COATED ORAL at 22:23

## 2019-10-27 RX ADMIN — ENOXAPARIN SODIUM 40 MILLIGRAM(S): 100 INJECTION SUBCUTANEOUS at 17:01

## 2019-10-27 RX ADMIN — Medication 50 GRAM(S): at 09:37

## 2019-10-27 NOTE — DISCHARGE NOTE PROVIDER - CARE PROVIDER_API CALL
Angelia Huynh)  Neurology; Vascular Neurology  [address], [address] [place] [address]  Phone: [phone]  Fax: [phone]  Follow Up Time:

## 2019-10-27 NOTE — PROGRESS NOTE ADULT - PROBLEM SELECTOR PLAN 1
-presented with left ear numbness and numbness in left big toe (which resolved). Found to have 85% sensation in V1-V3 and L leg  CT non con-No acute intracranial hemorrhage or evidence of acute territorial   infarction.  CTP-normal  CTA-No extracranial large vessel occlusion, high-grade stenosis, or evidence   of dissection.  - Closely follow neuro checks every 2-4 hours   - Repeat HCT for acute changes in neuro exam  - MRI Brain without contrast - Focus of acute ischemic change in the right posterior central gyrus  - Obtain TTE, NPO after midnight  - Goal -180  - PT/OT/SLP   - Hemoglobin A1C 6.0  - Lipid Profile Panel - high LDL and TG, low HDL  - TSH normal  - asp 81 mg  -plavix 75 mg  - lipitor 40 mg  -lovenox 40 mg  -SCDs

## 2019-10-27 NOTE — DIETITIAN INITIAL EVALUATION ADULT. - PROBLEM SELECTOR PLAN 1
-presented with left ear numbness and numbness in left big toe (which resolved). Found to have 85% sensation in V1-V3 and L leg  CT non con-No acute intracranial hemorrhage or evidence of acute territorial   infarction.  CTP-normal  CTA-No extracranial large vessel occlusion, high-grade stenosis, or evidence   of dissection.  - Closely follow neuro checks every 2-4 hours   - Repeat HCT for acute changes in neuro exam  - MRI Brain without contrast  - Obtain TTE  - Goal -180  - PT/OT/SLP   - Obtain Hemoglobin A1C, Lipid Profile Panel, and TSH  - asp 81 mg  -plavix 75 mg  - lipitor 40 mg  -lovenox 40 mg  -SCDs

## 2019-10-27 NOTE — DIETITIAN INITIAL EVALUATION ADULT. - OTHER INFO
75 y/o M w/ hx of BPH and FH of strokes coming in with complaints of L sided ear numbness found to have 85% sensation of L V1-V3 and LLE. Neurology following for r/o CVA. Pt found to have pre-DM (A1c 6% on 10/26, BG levels relatively controlled at this time) and elevated chol panel (10/26: , chol 199, HDL 38, ) during admission, nutrition consulted for diet education. Pt resting in bed with wife at bedside, pt gave persmission for wife to be present during assessment. Pt reports good appetite now and PTA, consuming >/=75% of meals at this time. Pt follows lactose-free diet PTa 2/2 intolerance, no other dietary restrictions, pt denies food allergies. Pt reports weight fluctuates between 175-180lb (documented weight 184lb on 10/26). GI: pt denies N/V, last BM 10/27, Skin: Markie score 23, Pain: pt denies pain at this time. Provided indepth diet education regarding CSTCHO diet and low chol, encouraged increased fiber/plant-based intake, provided multiple diet education handouts. Pt appeared receptive to education. Please see below for full nutritional recommendations. RD to monitor and f/u per protocol.

## 2019-10-27 NOTE — PHYSICAL THERAPY INITIAL EVALUATION ADULT - GROSSLY INTACT, SENSORY
Grossly Intact/Right UE/Left LE/Head/Neck/resolved in left toe, decreased L facial area (zygomatic, mandibular)/Left UE/Right LE

## 2019-10-27 NOTE — DIETITIAN INITIAL EVALUATION ADULT. - ADD RECOMMEND
1. Continue DASH/TLC, lactose restricted diet 2. Monitor need for CSTCHO diet 2/2 elevated A1c (6.0% on 10/26)

## 2019-10-27 NOTE — PROGRESS NOTE ADULT - PROBLEM/PLAN-1
----- Message from Cristy Henley sent at 1/17/2019  3:28 PM CST -----  Regarding: Please call her back  Contact: [phone]  She has questions regarding [facility] that only you can answer.  Please give her a call.   DISPLAY PLAN FREE TEXT

## 2019-10-27 NOTE — PHYSICAL THERAPY INITIAL EVALUATION ADULT - PERTINENT HX OF CURRENT PROBLEM, REHAB EVAL
75 y/o M w/ hx of BPH and FH of strokes coming in with complaints of L sided ear numbness found to have 85% sensation of L V1-V3 and LLE

## 2019-10-27 NOTE — PHYSICAL THERAPY INITIAL EVALUATION ADULT - ADDITIONAL COMMENTS
Patient is right handed, wears reading glasses, lives in residence with no steps to negotiate, no DME or device use.     Face is symmetric, tongue midline, vision intact with smooth pursuit.

## 2019-10-27 NOTE — DIETITIAN INITIAL EVALUATION ADULT. - ENERGY NEEDS
Ht (10/26): 172.7cm, Wt (10/26): 83.6kg, IBW: 70kg+/-10%, %IBW: 119%, BMI: 28   ABW used to calculate energy needs due to pt's current body weight within % IBW. Needs adjusted for age.

## 2019-10-27 NOTE — DISCHARGE NOTE PROVIDER - NSDCCPCAREPLAN_GEN_ALL_CORE_FT
PRINCIPAL DISCHARGE DIAGNOSIS  Diagnosis: Stroke-like symptoms  Assessment and Plan of Treatment: PRINCIPAL DISCHARGE DIAGNOSIS  Diagnosis: Cerebrovascular accident (CVA)  Assessment and Plan of Treatment: Your symtoms of numbness in your left ear and big toe were due to a stroke, which was seen on your MRI. You will be sent home on aspirin 81 mg (once a day), plavix 75 mg (once a day), and atorvastatin 80 mg (once a day) to prevent any future events like this. It is imperative to followup with your PCP as soon as possible. Should you start to experience symptoms such as but not limited to: changes in vision, changes in hearing, severe weakness/dizziness, fainting spells, or difficulties moving your eyelids or mouth, please return to the emergency department immediately for interval evaluation.        SECONDARY DISCHARGE DIAGNOSES  Diagnosis: GERD (gastroesophageal reflux disease)  Assessment and Plan of Treatment: Please followup with your primary care physcian (PCP) in regards to your medication omeprazole. long term use of this medication can lead to unwanted side effects and it is indicated as a long term medication but just for temporary use. Please discuss this with your PCP.    Diagnosis: Prediabetes  Assessment and Plan of Treatment:     Diagnosis: Benign prostatic hyperplasia, unspecified whether lower urinary tract symptoms present  Assessment and Plan of Treatment:     Diagnosis: CKD (chronic kidney disease), stage II  Assessment and Plan of Treatment:     Diagnosis: Abnormal EKG  Assessment and Plan of Treatment:     Diagnosis: Overweight (BMI 25.0-29.9)  Assessment and Plan of Treatment:

## 2019-10-27 NOTE — DISCHARGE NOTE PROVIDER - HOSPITAL COURSE
75 y/o M w/ hx of BPH and FH of strokes coming in with complaints of L sided ear numbness. He states that starting around 10:30am when he went to answer his phone, he noticed his ear was numb. He then also felt as if his L great toe was numb as well. He denied weakness, dizziness/lightheadedness, HA, CP, palpitations, SOB, abdominal pain, N/V/D/C, dysuria/hematuria. On admission, stroke code was called. NIHSS 1 for sensation deficits (patient noting sensation deficits on L side of face V1-V3 and L leg 85% of normal). CTH, CTA head and neck and CTP negative for acute infarct, hemorrhage or occlusion. CT neck w/ multilevel cervical spondylosis.         #R/o CVA    - ASA 81 PO qd     - Plavix 75 PO qd    - Lipitor 40    - Permissive HTN SBP goal 140-180    - MR brain    - TSH normal    - lipid panel - high LDL and TG, low HDL    - HbA1C 6.0    - PT/OT. 73 y/o M w/ hx of BPH and FH of strokes coming in with complaints of L sided ear numbness. He states that starting around 10:30am when he went to answer his phone, he noticed his ear was numb. He then also felt as if his L great toe was numb as well. He denied weakness, dizziness/lightheadedness, HA, CP, palpitations, SOB, abdominal pain, N/V/D/C, dysuria/hematuria. On admission, stroke code was called. NIHSS 1 for sensation deficits (patient noting sensation deficits on L side of face V1-V3 and L leg 85% of normal). CTH, CTA head and neck and CTP negative for acute infarct, hemorrhage or occlusion. CT neck w/ multilevel cervical spondylosis.         #R/o CVA    - ASA 81 PO qd     - Plavix 75 PO qd    - Lipitor 40    - Permissive HTN SBP goal 140-180    - MR brain showed Focus of acute ischemic change in the right posterior central gyrus.    -CORY with normal EF showed PFO    -LE dopplers showed     -- TSH normal    - lipid panel - high LDL and TG, low HDL    - HbA1C 6.0        New Medications: aspirin 81 mg, plavix 75 mg, atorvastatin 80 mg 73 y/o M w/ hx of BPH and FH of strokes coming in with complaints of L sided ear numbness. He states that starting around 10:30am when he went to answer his phone, he noticed his ear was numb. He then also felt as if his L great toe was numb as well. He denied weakness, dizziness/lightheadedness, HA, CP, palpitations, SOB, abdominal pain, N/V/D/C, dysuria/hematuria. On admission, stroke code was called. NIHSS 1 for sensation deficits (patient noting sensation deficits on L side of face V1-V3 and L leg 85% of normal). CTH, CTA head and neck and CTP negative for acute infarct, hemorrhage or occlusion. CT neck w/ multilevel cervical spondylosis.         #R/o CVA    - ASA 81 PO qd     - Plavix 75 PO qd    - Lipitor 40    - Permissive HTN SBP goal 140-180    - MR brain showed Focus of acute ischemic change in the right posterior central gyrus that could correlate with left facial sensory loss.    -CORY with normal EF showed PFO    -LE dopplers showed     -- TSH normal    - lipid panel - high LDL and TG, low HDL    - HbA1C 6.0        New Medications: aspirin 81 mg, plavix 75 mg, atorvastatin 80 mg 75 y/o M w/ hx of BPH and FH of strokes coming in with complaints of L sided ear numbness. He states that starting around 10:30am when he went to answer his phone, he noticed his ear was numb. He then also felt as if his L great toe was numb as well. He denied weakness, dizziness/lightheadedness, HA, CP, palpitations, SOB, abdominal pain, N/V/D/C, dysuria/hematuria. On admission, stroke code was called. NIHSS 1 for sensation deficits (patient noting sensation deficits on L side of face V1-V3 and L leg 85% of normal). CTH, CTA head and neck and CTP negative for acute infarct, hemorrhage or occlusion. CT neck w/ multilevel cervical spondylosis.         #R/o CVA    - ASA 81 PO qd     - Plavix 75 PO qd    - Lipitor 40    - Permissive HTN SBP goal 140-180    - MR brain showed Focus of acute ischemic change in the right posterior central gyrus that could correlate with left facial sensory loss.    -CORY with normal EF showed PFO    -LE dopplers showed     -- TSH normal    - lipid panel - high LDL and TG, low HDL    - HbA1C 6.0        New Medications: aspirin 81 mg, plavix 75 mg, atorvastatin 80 mg     Follow up tests as outpatient: Hypercoagulable labs pending

## 2019-10-27 NOTE — DISCHARGE NOTE PROVIDER - NSDCFUADDAPPT_GEN_ALL_CORE_FT
Please call to schedule a follow up appointment with neurology within 1-2 weeks of discharge for further management. Please call to schedule a follow up appointment with neurology/ primary care physician within 1-2 weeks of discharge for further management.

## 2019-10-27 NOTE — PROGRESS NOTE ADULT - SUBJECTIVE AND OBJECTIVE BOX
OVERNIGHT EVENTS: Patient persistantly manda to 50s overnight, asymptomatic with stable BPs    SUBJECTIVE:  Patient seen and examined at bedside. He says that he still feels numbness around his left ear but his left lower extremity weakness has resolved. Otherwise, he denies fevers, chills, chest pain, shortness of breath, abdominal pain, n/v/d/c, edema.    Vital Signs Last 12 Hrs  T(F): 97.7 (10-27-19 @ 15:20), Max: 99 (10-27-19 @ 06:00)  HR: 84 (10-27-19 @ 13:10) (52 - 84)  BP: 137/83 (10-27-19 @ 13:10) (114/69 - 142/67)  BP(mean): 105 (10-27-19 @ 13:10) (85 - 105)  RR: 16 (10-27-19 @ 13:10) (16 - 16)  SpO2: 93% (10-27-19 @ 13:10) (93% - 97%)  I&O's Summary      PHYSICAL EXAM:  Constitutional: WDWN; NAD  Cardiovascular: RRR, no appreciable murmurs; no carotid bruits  Ear exam: cerumen impaction B/L   Neurologic:  Mental status: Awake, alert and oriented x3.  Recent and remote memory intact.  Naming, repetition and comprehension intact.  Attention/concentration intact.  No dysarthria, no aphasia.  Fund of knowledge appropriate.    Cranial nerves: Pupils equally round and reactive to light, visual fields full, no nystagmus, extraocular muscles intact, V1 through V3 on L decreased sensation to touch, face symmetric, hearing intact to finger rub, palate elevation symmetric, tongue was midline, sternocleidomastoid/shoulder shrug strength bilaterally 5/5.    Motor:  Normal bulk and tone, strength 5/5 in bilateral upper and lower extremities.   strength 5/5.  Rapid alternating movements intact and symmetric.   Sensation: Intact to light touch, proprioception, and pinprick.  No neglect.   Coordination: No dysmetria on finger-to-nose and heel-to-shin.  No clumsiness.  Reflexes: 2+ in upper and lower extremities, downgoing toes bilaterally  Gait: Narrow and steady. No ataxia.  Romberg negative        LABS:                        12.3   6.78  )-----------( 192      ( 27 Oct 2019 07:38 )             37.7     10-27    138  |  102  |  18  ----------------------------<  108<H>  3.9   |  24  |  1.06    Ca    8.7      27 Oct 2019 07:38  Mg     1.7     10-27    TPro  7.1  /  Alb  4.3  /  TBili  0.5  /  DBili  x   /  AST  25  /  ALT  33  /  AlkPhos  45  10-26    PT/INR - ( 26 Oct 2019 12:17 )   PT: 12.1 sec;   INR: 1.07          PTT - ( 26 Oct 2019 12:17 )  PTT:30.1 sec  Urinalysis Basic - ( 26 Oct 2019 13:00 )    Color: Yellow / Appearance: Clear / S.010 / pH: x  Gluc: x / Ketone: NEGATIVE  / Bili: Negative / Urobili: 0.2 E.U./dL   Blood: x / Protein: NEGATIVE mg/dL / Nitrite: NEGATIVE   Leuk Esterase: NEGATIVE / RBC: 5-10 /HPF / WBC < 5 /HPF   Sq Epi: x / Non Sq Epi: 0-5 /HPF / Bacteria: None /HPF          RADIOLOGY & ADDITIONAL TESTS:    MEDICATIONS  (STANDING):  aspirin enteric coated 81 milliGRAM(s) Oral daily  atorvastatin 40 milliGRAM(s) Oral at bedtime  enoxaparin Injectable 40 milliGRAM(s) SubCutaneous every 24 hours  influenza   Vaccine 0.5 milliLiter(s) IntraMuscular once  pantoprazole    Tablet 40 milliGRAM(s) Oral before breakfast  tamsulosin 0.4 milliGRAM(s) Oral at bedtime    MEDICATIONS  (PRN):

## 2019-10-27 NOTE — PHYSICAL THERAPY INITIAL EVALUATION ADULT - GENERAL OBSERVATIONS, REHAB EVAL
Patient received semi-supine in bed in no acute distress, states area of L side of face is still numb but a smaller area than admission. Decreased sensation along L zygomatic, mandibular, intact along orbital and frontal

## 2019-10-28 VITALS — DIASTOLIC BLOOD PRESSURE: 71 MMHG | OXYGEN SATURATION: 95 % | SYSTOLIC BLOOD PRESSURE: 125 MMHG | HEART RATE: 62 BPM

## 2019-10-28 DIAGNOSIS — N40.0 BENIGN PROSTATIC HYPERPLASIA WITHOUT LOWER URINARY TRACT SYMPTOMS: ICD-10-CM

## 2019-10-28 DIAGNOSIS — E66.3 OVERWEIGHT: ICD-10-CM

## 2019-10-28 DIAGNOSIS — N18.2 CHRONIC KIDNEY DISEASE, STAGE 2 (MILD): ICD-10-CM

## 2019-10-28 DIAGNOSIS — R94.31 ABNORMAL ELECTROCARDIOGRAM [ECG] [EKG]: ICD-10-CM

## 2019-10-28 PROCEDURE — 93970 EXTREMITY STUDY: CPT | Mod: 26

## 2019-10-28 PROCEDURE — 99238 HOSP IP/OBS DSCHRG MGMT 30/<: CPT

## 2019-10-28 PROCEDURE — 93312 ECHO TRANSESOPHAGEAL: CPT | Mod: 26

## 2019-10-28 PROCEDURE — 76376 3D RENDER W/INTRP POSTPROCES: CPT | Mod: 26

## 2019-10-28 PROCEDURE — 99223 1ST HOSP IP/OBS HIGH 75: CPT | Mod: GC

## 2019-10-28 RX ORDER — CLOPIDOGREL BISULFATE 75 MG/1
1 TABLET, FILM COATED ORAL
Qty: 30 | Refills: 0
Start: 2019-10-28 | End: 2019-11-26

## 2019-10-28 RX ORDER — CLOPIDOGREL BISULFATE 75 MG/1
75 TABLET, FILM COATED ORAL DAILY
Refills: 0 | Status: DISCONTINUED | OUTPATIENT
Start: 2019-10-28 | End: 2019-10-28

## 2019-10-28 RX ORDER — ASPIRIN/CALCIUM CARB/MAGNESIUM 324 MG
1 TABLET ORAL
Qty: 30 | Refills: 0
Start: 2019-10-28 | End: 2019-11-26

## 2019-10-28 RX ORDER — ATORVASTATIN CALCIUM 80 MG/1
1 TABLET, FILM COATED ORAL
Qty: 30 | Refills: 0
Start: 2019-10-28 | End: 2019-11-26

## 2019-10-28 RX ADMIN — PANTOPRAZOLE SODIUM 40 MILLIGRAM(S): 20 TABLET, DELAYED RELEASE ORAL at 06:39

## 2019-10-28 RX ADMIN — ENOXAPARIN SODIUM 40 MILLIGRAM(S): 100 INJECTION SUBCUTANEOUS at 15:33

## 2019-10-28 RX ADMIN — ATORVASTATIN CALCIUM 80 MILLIGRAM(S): 80 TABLET, FILM COATED ORAL at 21:29

## 2019-10-28 RX ADMIN — Medication 81 MILLIGRAM(S): at 15:33

## 2019-10-28 RX ADMIN — TAMSULOSIN HYDROCHLORIDE 0.4 MILLIGRAM(S): 0.4 CAPSULE ORAL at 21:29

## 2019-10-28 RX ADMIN — INFLUENZA VIRUS VACCINE 0.5 MILLILITER(S): 15; 15; 15; 15 SUSPENSION INTRAMUSCULAR at 21:47

## 2019-10-28 NOTE — DISCHARGE NOTE NURSING/CASE MANAGEMENT/SOCIAL WORK - PATIENT PORTAL LINK FT
You can access the FollowMyHealth Patient Portal offered by [facility] by registering at the following website: [email or web address]. By joining [facility]’s FollowMyHealth portal, you will also be able to view your health information using other applications (apps) compatible with our system.

## 2019-10-28 NOTE — CONSULT NOTE ADULT - ASSESSMENT
per Neurology    75 y/o M w/ hx of BPH and FH of strokes coming in with complaints of L sided ear numbness found to have 85% sensation of L V1-V3 and LLE    Problem/Plan - 1:  ·  Problem: CVA (cerebral vascular accident).  Plan: -presented with left ear numbness and numbness in left big toe (which resolved). Found to have 85% sensation in V1-V3 and L leg  CT non con-No acute intracranial hemorrhage or evidence of acute territorial   infarction.  CTP-normal  CTA-No extracranial large vessel occlusion, high-grade stenosis, or evidence   of dissection.  - Closely follow neuro checks every 2-4 hours   - Repeat HCT for acute changes in neuro exam  - MRI Brain without contrast - Focus of acute ischemic change in the right posterior central gyrus  - Obtain TTE  - Goal -180  - PT/OT/SLP   - Hemoglobin A1C 6.0  - Lipid Profile Panel - high LDL and TG, low HDL  - TSH normal  - asp 81 mg  -plavix 75 mg  - lipitor 40 mg  -lovenox 40 mg  -SCDs.     Problem/Plan - 2:  ·  Problem: BPH (benign prostatic hyperplasia).  Plan: -takes flomax and tamsulosin  -can resume home dose.     Problem/Plan - 3:  ·  Problem: Prediabetes.  Plan: A1c 6.0%  - Nutrition consult to discuss diet and exercise.     Problem/Plan - 4:  ·  Problem: Nutrition, metabolism, and development symptoms.  Plan: replete lytes as needed  lovenox 40 mg  diet dash tlc.
73 y/o M w/ hx of BPH and FH of strokes coming in with complaints of L sided ear numbness found to have 85% sensation of L V1-V3 and LLE    #R/o CVA  - ASA 81 PO qd   - Plavix 75 PO qd  - Lipitor 40  - Permissive HTN SBP goal 140-180  - MR brain  - f/u lipid panel  - f/u HbA1C  - PT/OT.   - TTE    Discussed w/ Dr. Woodard
Pt is a 73 y/o M w/ hx of BPH and FH of strokes coming in with complaints of L sided ear numbness on 10/26 now on 7 lachman for further management and workup

## 2019-10-28 NOTE — OCCUPATIONAL THERAPY INITIAL EVALUATION ADULT - GENERAL OBSERVATIONS, REHAB EVAL
Patient cleared for OT evaluation by SARAH Ramirez. Patient received seated in bedside chair, NAD, +tele, +IV heplock.

## 2019-10-28 NOTE — OCCUPATIONAL THERAPY INITIAL EVALUATION ADULT - GROSSLY INTACT, SENSORY
Above left ear 90% sensation compared to right side/Grossly Intact/Right LE/Right UE/Left UE/Left LE

## 2019-10-28 NOTE — DISCHARGE NOTE NURSING/CASE MANAGEMENT/SOCIAL WORK - NSDCFUADDAPPT_GEN_ALL_CORE_FT
Please call to schedule a follow up appointment with neurology/ primary care physician within 1-2 weeks of discharge for further management.

## 2019-10-28 NOTE — OCCUPATIONAL THERAPY INITIAL EVALUATION ADULT - MANUAL MUSCLE TESTING RESULTS, REHAB EVAL
Bilateral upper extremities 5/5 throughout. Tongue protrusion/smile/cheekpuff/eyebrow elevation symmetrical./no strength deficits were identified

## 2019-10-28 NOTE — CONSULT NOTE ADULT - PROBLEM SELECTOR RECOMMENDATION 9
Imaging as above  -C/w management as per stroke team  -C/w ASA, Statin  -Discuss Plavix?  -Pt pending CORY

## 2019-10-28 NOTE — CONSULT NOTE ADULT - SUBJECTIVE AND OBJECTIVE BOX
Patient is a 74y old  Male who presents with a chief complaint of      HPI:  75 y/o M w/ hx of BPH and FH of strokes coming in with complaints of L sided ear numbness. He states that starting around 10:30am when he went to answer his phone, he noticed his ear was numb. He then also felt as if his L great toe was numb as well. He denied weakness, dizziness/lightheadedness, HA, CP, palpitations, SOB, abdominal pain, N/V/D/C, dysuria/hematuria. On admission, vitals T-97.6, HR-74, BP-161/95, RR-18, SpO2-96% on RA. Stroke code was called. NIHSS 1 for sensation deficits (patient noting sensation deficits on L side of face V1-V3 and L leg 85% of normal). CTH, CTA head and neck and CTP negative for acute infarct, hemorrhage or occlusion. CT neck w/ multilevel cervical spondylosis. (26 Oct 2019 18:49)      PAST MEDICAL & SURGICAL HISTORY:  BPH (benign prostatic hyperplasia)      MEDICATIONS  (STANDING):  aspirin enteric coated 81 milliGRAM(s) Oral daily  atorvastatin 80 milliGRAM(s) Oral at bedtime  enoxaparin Injectable 40 milliGRAM(s) SubCutaneous every 24 hours  influenza   Vaccine 0.5 milliLiter(s) IntraMuscular once  pantoprazole    Tablet 40 milliGRAM(s) Oral before breakfast  tamsulosin 0.4 milliGRAM(s) Oral at bedtime    MEDICATIONS  (PRN):      Social History:           -  Occupation: X           -  Home Living Status: lives with his wife in a private house           -  Prior Home Care Services:  X    Functional Level Prior to Admission:           - ADL's:  fully independent           - ambulates without assistive devices    FAMILY HISTORY:      CBC Full  -  ( 27 Oct 2019 07:38 )  WBC Count : 6.78 K/uL  RBC Count : 3.90 M/uL  Hemoglobin : 12.3 g/dL  Hematocrit : 37.7 %  Platelet Count - Automated : 192 K/uL  Mean Cell Volume : 96.7 fl  Mean Cell Hemoglobin : 31.5 pg  Mean Cell Hemoglobin Concentration : 32.6 gm/dL  Auto Neutrophil # : x  Auto Lymphocyte # : x  Auto Monocyte # : x  Auto Eosinophil # : x  Auto Basophil # : x  Auto Neutrophil % : x  Auto Lymphocyte % : x  Auto Monocyte % : x  Auto Eosinophil % : x  Auto Basophil % : x      10-27    138  |  102  |  18  ----------------------------<  108<H>  3.9   |  24  |  1.06    Ca    8.7      27 Oct 2019 07:38  Mg     1.7     10-27    TPro  7.1  /  Alb  4.3  /  TBili  0.5  /  DBili  x   /  AST  25  /  ALT  33  /  AlkPhos  45  10-26      Urinalysis Basic - ( 26 Oct 2019 13:00 )    Color: Yellow / Appearance: Clear / S.010 / pH: x  Gluc: x / Ketone: NEGATIVE  / Bili: Negative / Urobili: 0.2 E.U./dL   Blood: x / Protein: NEGATIVE mg/dL / Nitrite: NEGATIVE   Leuk Esterase: NEGATIVE / RBC: 5-10 /HPF / WBC < 5 /HPF   Sq Epi: x / Non Sq Epi: 0-5 /HPF / Bacteria: None /HPF          Radiology:    < from: MR Head No Cont (10.27.19 @ 14:24) >  EXAM:  MR BRAIN                          PROCEDURE DATE:  10/27/2019          INTERPRETATION:  IRadha MD, have reviewed the images and the   report and agree with the findings, with the following modification:    Small acute infarct right post central gyrus, likely embolic. Multiple   foci of T2 hyperintensity within the cerebral white matter, nonspecific   but likely reflective of chronic ischemic microangiopathy in a patient of   this age. Remote right cerebellar lacunar infarct.    PROCEDURE: MRI Brain without contrast    INDICATION: Numbness, stroke.    TECHNIQUE: Sagittal T1, axial T1, T2, FLAIR, diffusion, SWI and coronal   FLAIR images of the brain were obtained.    COMPARISON: CT head from 10/26/2019    FINDINGS:   Punctate focus of increased signal on diffusion with corresponding   decrease on ADC in the right posterior central gyrus consistent with a   small acute ischemic change.    IMPRESSION:   Focus of acute ischemic change in the right posterior central gyrus.        < from: CT Brain Stroke Protocol (10.26.19 @ 12:36) >    EXAM:  CT BRAIN STROKE PROTOCOL                          PROCEDURE DATE:  10/26/2019          INTERPRETATION:  INDICATIONS: Stroke code. Left facial numbness.    TECHNIQUE:  Serial axial images were obtained from the skull base to the   vertex without the use of intravenous contrast. Coronal and sagittal   reconstructions were created and reviewed.    COMPARISON: none.     FINDINGS:    VENTRICLES AND SULCI: Age-appropriate parenchymal volume loss.   INTRA-AXIAL: No intracranial mass, acute hemorrhage, or midline shift is   present. There is no evidence of acute territorial infarction. There is   minimal periventricular and subcortical white matter hypoattenuation,   most likely representing the sequela of long-standing microvascular   ischemia.  EXTRA-AXIAL: No extra-axial fluid collection is present.   VISUALIZED SINUSES: No air-fluid levels are identified.   VISUALIZED MASTOIDS:  Clear.  CALVARIUM:  Normal.  MISCELLANEOUS:  None.    IMPRESSION:    No acute intracranial hemorrhage or evidence of acute territorial   infarction.        < from: CT Perfusion w/ Maps w/ IV Cont (10.26.19 @ 12:00) >  EXAM:  CT PERFUSION W MAPS IC                          PROCEDURE DATE:  10/26/2019          INTERPRETATION:  PROCEDURE: CT Perfusion with intravenous contrast.    INDICATION: Stroke code. Left facial numbness.    TECHNIQUE: Following the intravenous administration of 40 ml of Optiray   350, serial axial images were obtained through the brain. The CT   perfusion data set was post processed per [facility] protocol   generating color maps of CBF, CBV, MTT, and Tmax.    COMPARISON: None    FINDINGS: The CT perfusion study demonstrates no perfusion abnormality.   There is no mismatch volume.    IMPRESSION: Normal CT perfusion study.        < from: CT Angio Head w/ IV Cont (10.26.19 @ 12:00) >    EXAM:  CT ANGIO BRAIN (W)AW IC                          EXAM:  CT ANGIO NECK (W)AW IC                          PROCEDURE DATE:  10/26/2019          INTERPRETATION:  PROCEDURE: CTA brain with intravenous contrast.    INDICATION: Stroke code. Left facial numbness.    TECHNIQUE: Multiple axial thin section were obtained through the [place]   [place] following the intravenous bolus injection of 85 cc of   Optiray-350. Coronal and sagittal MIPS and soft tissue reformations were   created and reviewed.    COMPARISON: None.    FINDINGS:     The bilateral internal carotid arteries are widely patent from the skull   base to their termini. There is a normal bifurcation into the A1 and M1   segments bilaterally. The intracranial portions of the vertebral arteries   are normal in appearance. The basilar artery is normal. There is a normal   bifurcation into the posterior cerebral arteries bilaterally. There are   no areas of stenosis, dilatation, or aneurysm.    IMPRESSION CTA BRAIN:    Unremarkable CTA examination of the brain.      PROCEDURE: CTA neck with intravenous contrast.    INDICATION: Recurrent. Left facial numbness.    TECHNIQUE: Multiple axial thin section were obtained through the neck   following the intravenous bolus injection of Optiray-350 as mentioned   above. Coronal and sagittal MIPS and soft tissue reformations were   created and reviewed.     COMPARISON: None    FINDINGS:     The bilateral common carotid arteries and cervical portions of the   internal carotid arteries are widely patent. The bilateral internal   carotid arteries are mildly tortuous.    The visualized vertebral arteries are normal in contour and caliber   throughout their extracranial course.    The aortic arch appears intact without narrowing of the origin of the   great vessels.    There is moderate multilevel cervical spondylosis    IMPRESSION CTA NECK:    No extracranial large vessel occlusion, high-grade stenosis, or evidence   of dissection.              Vital Signs Last 24 Hrs  T(C): 36.7 (28 Oct 2019 01:45), Max: 36.7 (28 Oct 2019 01:45)  T(F): 98.1 (28 Oct 2019 01:45), Max: 98.1 (28 Oct 2019 01:45)  HR: 60 (28 Oct 2019 08:24) (52 - 84)  BP: 143/74 (28 Oct 2019 08:24) (121/66 - 158/78)  BP(mean): 99 (28 Oct 2019 08:24) (88 - 109)  RR: 22 (28 Oct 2019 08:24) (14 - 22)  SpO2: 93% (28 Oct 2019 08:24) (93% - 96%)    REVIEW OF SYSTEMS:    CONSTITUTIONAL:  fatigue  EYES: No eye pain, visual disturbances, or discharge  ENMT:  No difficulty hearing, tinnitus, vertigo; No sinus or throat pain  NECK: No pain or stiffness  BREASTS: No pain, masses, or nipple discharge  RESPIRATORY: No cough, wheezing, chills or hemoptysis; No shortness of breath  CARDIOVASCULAR: No chest pain, palpitations, dizziness, or leg swelling  GASTROINTESTINAL: No abdominal or epigastric pain. No nausea, vomiting, or hematemesis; No diarrhea or constipation. No melena or hematochezia.  GENITOURINARY: No dysuria, frequency, hematuria, or incontinence  NEUROLOGICAL: No headaches, memory loss, loss of strength, numbness, or tremors  SKIN: No itching, burning, rashes, or lesions   LYMPH NODES: No enlarged glands  ENDOCRINE: No heat or cold intolerance; No hair loss  MUSCULOSKELETAL: No joint pain or swelling; No muscle, back, or extremity pain  PSYCHIATRIC: No depression, anxiety, mood swings, or difficulty sleeping  HEME/LYMPH: No easy bruising, or bleeding gums  ALLERGY AND IMMUNOLOGIC: No hives or eczema  VASCULAR: no swelling, erythema      Physical Exam: 73 yo  gentleman lying in semi Rowe's position, c/o fatigue    Head: normocephalic, atraumatic    Eyes: PERRLA, EOMI, no nystagmus, sclera anicteric    ENT: nasal discharge, uvula midline, no oropharyngeal erythema/exudate    Neck: supple, negative JVD, negative carotid bruits, no thyromegaly    Chest: CTA bilaterally, neg wheeze/ rhonchi/ rales/ crackles/ egophany    Cardiovascular: regular rate and rhythm, neg murmurs/rubs/gallops    Abdomen: soft, non distended, non tender, negative rebound/guarding, normal bowel sounds, neg hepatosplenomegaly    Extremities: WWP, neg cyanosis/clubbing/edema, negative calf tenderness to palpation, negative Tasha's sign    :     Neurologic Exam:    Alert and oriented to person, place, date/year, speech fluent w/o dysarthria, recent and remote memory intact, repetition intact, comprehension intact    Cranial Nerves:     II:                       pupils equal, round and reactive to light, visual fields intact   III/ IV/VI:            extraocular movements intact, neg nystagmus, neg ptosis  V:                     dec sensation L V2/3  VII:                     face symmetric, no droop, normal eye closure and smile  VIII:                    hearing intact to finger rub bilaterally  IX/ X:                 soft palate rise symmetrical  XI:                      head turning, shoulder shrug normal  XII:                     tongue midline    Motor Exam:    Upper Extremities:     RIght:   5/5   /intrinsics               5/5  wrist flexors/extensors               5/5  biceps/triceps               5/5  deltoid               negative drift    Left :    5/5  /intrinsics               5/5  wrist flexors/extensors               5/5 biceps/triceps               5/5 deltoid               negative drift    Lower Extremities:                 Right:    5/5  DF/PF/ evertors/ invertors                5/5  Quad/ hamstrings                5/5  hip flexors/adductors/abductors                 Left:      5/5  DF/PF/ evertors/ invertors                5/5  Quad/ hamstrings                5/5  hip flexors/adductors/abductors                       Sensory:    intact to LT/PP in all UE/LE dermatomes    DTR:            = biceps/     triceps/     brachioradialis                      = patella/   medial hamstring/ankle                      neg clonus                      neg Babinski                        Finger to Nose:  wnl    Heel to Shin:  wnl    Rapid Alternating movements:  wnl    Joint Position Sense:  intact    Romberg:  not tested    Tandem Walking:  not tested    Gait:  not tested        PM&R Impression:    1) Focus of acute ischemic change in the right posterior central gyrus  2) no focal weakness        Recommendations:    1) Physical therapy focusing on therapeutic exercises, bed mobility/transfer out of bed evaluation, progressive ambulation with assistive devices prn.    2) Current Disposition Plan/Recs:  d/c home with no post discharge rehab needs

## 2019-10-28 NOTE — CONSULT NOTE ADULT - SUBJECTIVE AND OBJECTIVE BOX
HPI:  Brief Hospital Course:  Pt is a 73 y/o M w/ hx of BPH and FH of strokes coming in with complaints of L sided ear numbness on 10/26. In the ED stroke code was called. NIHSS 1 for sensation deficits (patient noting sensation deficits on L side of face V1-V3 and L leg 85% of normal). CTH, CTA head and neck and CTP negative for acute infarct, hemorrhage or occlusion. CT neck w/ multilevel cervical spondylosis.     Subjective:      Allergies    sulfa drugs (Other)    Intolerances    lactose (Diarrhea)    MEDICATIONS  (STANDING):  aspirin enteric coated 81 milliGRAM(s) Oral daily  atorvastatin 80 milliGRAM(s) Oral at bedtime  enoxaparin Injectable 40 milliGRAM(s) SubCutaneous every 24 hours  influenza   Vaccine 0.5 milliLiter(s) IntraMuscular once  pantoprazole    Tablet 40 milliGRAM(s) Oral before breakfast  tamsulosin 0.4 milliGRAM(s) Oral at bedtime    MEDICATIONS  (PRN):    Drug Dosing Weight  Height (cm): 172.72 (26 Oct 2019 11:30)  Weight (kg): 83.6 (26 Oct 2019 11:30)  BMI (kg/m2): 28 (26 Oct 2019 11:30)  BSA (m2): 1.97 (26 Oct 2019 11:30)    PAST MEDICAL & SURGICAL HISTORY:  BPH (benign prostatic hyperplasia)  PSH: none    FAMILY HISTORY: stroke    SOCIAL HISTORY:    Vital Signs Last 24 Hrs  T(C): 36.4 (28 Oct 2019 09:33), Max: 36.7 (28 Oct 2019 01:45)  T(F): 97.6 (28 Oct 2019 09:33), Max: 98.1 (28 Oct 2019 01:45)  HR: 60 (28 Oct 2019 08:24) (52 - 84)  BP: 143/74 (28 Oct 2019 08:24) (121/66 - 158/78)  BP(mean): 99 (28 Oct 2019 08:24) (88 - 109)  ABP: --  ABP(mean): --  RR: 22 (28 Oct 2019 08:24) (14 - 22)  SpO2: 93% (28 Oct 2019 08:24) (93% - 96%)    I&O's Detail    27 Oct 2019 07:01  -  28 Oct 2019 07:00  --------------------------------------------------------  IN:  Total IN: 0 mL    OUT:    Voided: 600 mL  Total OUT: 600 mL    Total NET: -600 mL          PHYSICAL EXAM:      Constitutional:    Eyes:    ENMT:    Neck:    Breasts:    Back:    Respiratory:    Cardiovascular:    Gastrointestinal:    Genitourinary:    Rectal:    Extremities:    Vascular:    Neurological:    Skin:    Lymph Nodes:    Musculoskeletal:    Psychiatric:        LABS:  CBC Full  -  ( 27 Oct 2019 07:38 )  WBC Count : 6.78 K/uL  RBC Count : 3.90 M/uL  Hemoglobin : 12.3 g/dL  Hematocrit : 37.7 %  Platelet Count - Automated : 192 K/uL  Mean Cell Volume : 96.7 fl  Mean Cell Hemoglobin : 31.5 pg  Mean Cell Hemoglobin Concentration : 32.6 gm/dL  Auto Neutrophil # : x  Auto Lymphocyte # : x  Auto Monocyte # : x  Auto Eosinophil # : x  Auto Basophil # : x  Auto Neutrophil % : x  Auto Lymphocyte % : x  Auto Monocyte % : x  Auto Eosinophil % : x  Auto Basophil % : x    10-27    138  |  102  |  18  ----------------------------<  108<H>  3.9   |  24  |  1.06    Ca    8.7      27 Oct 2019 07:38  Mg     1.7     10    TPro  7.1  /  Alb  4.3  /  TBili  0.5  /  DBili  x   /  AST  25  /  ALT  33  /  AlkPhos  45  10-    CAPILLARY BLOOD GLUCOSE  109 (26 Oct 2019 13:24)      POCT Blood Glucose.: 109 mg/dL (26 Oct 2019 11:33)    PT/INR - ( 26 Oct 2019 12:17 )   PT: 12.1 sec;   INR: 1.07          PTT - ( 26 Oct 2019 12:17 )  PTT:30.1 sec  Urinalysis Basic - ( 26 Oct 2019 13:00 )    Color: Yellow / Appearance: Clear / S.010 / pH: x  Gluc: x / Ketone: NEGATIVE  / Bili: Negative / Urobili: 0.2 E.U./dL   Blood: x / Protein: NEGATIVE mg/dL / Nitrite: NEGATIVE   Leuk Esterase: NEGATIVE / RBC: 5-10 /HPF / WBC < 5 /HPF   Sq Epi: x / Non Sq Epi: 0-5 /HPF / Bacteria: None /HPF        EKG:  Anterior/Lateral T wave inversions/flattening    ECHO, US:    RADIOLOGY:  MRI brain w/o:  Focus of acute ischemic change in the right posterior central gyrus. HPI:  Brief Hospital Course:  Pt is a 75 y/o M w/ hx of BPH and FH of strokes coming in with complaints of L sided ear numbness on 10/26. In the ED stroke code was called. NIHSS 1 for sensation deficits (patient noting sensation deficits on L side of face V1-V3 and L leg 85% of normal). CTH, CTA head and neck and CTP negative for acute infarct, hemorrhage or occlusion. CT neck w/ multilevel cervical spondylosis.     Subjective:  Pt has no acute complaints. 12 pt ROS is negative    Allergies    sulfa drugs (Other)    Intolerances    lactose (Diarrhea)    MEDICATIONS  (STANDING):  aspirin enteric coated 81 milliGRAM(s) Oral daily  atorvastatin 80 milliGRAM(s) Oral at bedtime  enoxaparin Injectable 40 milliGRAM(s) SubCutaneous every 24 hours  influenza   Vaccine 0.5 milliLiter(s) IntraMuscular once  pantoprazole    Tablet 40 milliGRAM(s) Oral before breakfast  tamsulosin 0.4 milliGRAM(s) Oral at bedtime    MEDICATIONS  (PRN):    Drug Dosing Weight  Height (cm): 172.72 (26 Oct 2019 11:30)  Weight (kg): 83.6 (26 Oct 2019 11:30)  BMI (kg/m2): 28 (26 Oct 2019 11:30)  BSA (m2): 1.97 (26 Oct 2019 11:30)    PAST MEDICAL & SURGICAL HISTORY:  BPH (benign prostatic hyperplasia)  PSH: none    FAMILY HISTORY: stroke    SOCIAL HISTORY: none drinker    Vital Signs Last 24 Hrs  T(C): 36.4 (28 Oct 2019 09:33), Max: 36.7 (28 Oct 2019 01:45)  T(F): 97.6 (28 Oct 2019 09:33), Max: 98.1 (28 Oct 2019 01:45)  HR: 60 (28 Oct 2019 08:24) (52 - 84)  BP: 143/74 (28 Oct 2019 08:24) (121/66 - 158/78)  BP(mean): 99 (28 Oct 2019 08:24) (88 - 109)  ABP: --  ABP(mean): --  RR: 22 (28 Oct 2019 08:24) (14 - 22)  SpO2: 93% (28 Oct 2019 08:24) (93% - 96%)    I&O's Detail    27 Oct 2019 07:01  -  28 Oct 2019 07:00  --------------------------------------------------------  IN:  Total IN: 0 mL    OUT:    Voided: 600 mL  Total OUT: 600 mL    Total NET: -600 mL          PHYSICAL EXAM:      Constitutional: NAD  Eyes: PERRLA  ENMT: MMM  Neck: supple  Back: midline  Respiratory: CTA b/l  Cardiovascular: rrr, s1s2, no m/r/g  Gastrointestinal: soft, NTND, + BS  Extremities: warm  Vascular: + 2 pulses radial  Neurological: AAO  x 4, 5/5 strength in all extremities  Skin: no rash  Lymph Nodes: no LAD  Musculoskeletal: no joint swelling  Psychiatric: normal affect        LABS:  CBC Full  -  ( 27 Oct 2019 07:38 )  WBC Count : 6.78 K/uL  RBC Count : 3.90 M/uL  Hemoglobin : 12.3 g/dL  Hematocrit : 37.7 %  Platelet Count - Automated : 192 K/uL  Mean Cell Volume : 96.7 fl  Mean Cell Hemoglobin : 31.5 pg  Mean Cell Hemoglobin Concentration : 32.6 gm/dL  Auto Neutrophil # : x  Auto Lymphocyte # : x  Auto Monocyte # : x  Auto Eosinophil # : x  Auto Basophil # : x  Auto Neutrophil % : x  Auto Lymphocyte % : x  Auto Monocyte % : x  Auto Eosinophil % : x  Auto Basophil % : x    10-27    138  |  102  |  18  ----------------------------<  108<H>  3.9   |  24  |  1.06    Ca    8.7      27 Oct 2019 07:38  Mg     1.7     10-27    TPro  7.1  /  Alb  4.3  /  TBili  0.5  /  DBili  x   /  AST  25  /  ALT  33  /  AlkPhos  45  10-    CAPILLARY BLOOD GLUCOSE  109 (26 Oct 2019 13:24)      POCT Blood Glucose.: 109 mg/dL (26 Oct 2019 11:33)    PT/INR - ( 26 Oct 2019 12:17 )   PT: 12.1 sec;   INR: 1.07          PTT - ( 26 Oct 2019 12:17 )  PTT:30.1 sec  Urinalysis Basic - ( 26 Oct 2019 13:00 )    Color: Yellow / Appearance: Clear / S.010 / pH: x  Gluc: x / Ketone: NEGATIVE  / Bili: Negative / Urobili: 0.2 E.U./dL   Blood: x / Protein: NEGATIVE mg/dL / Nitrite: NEGATIVE   Leuk Esterase: NEGATIVE / RBC: 5-10 /HPF / WBC < 5 /HPF   Sq Epi: x / Non Sq Epi: 0-5 /HPF / Bacteria: None /HPF        EKG:  Anterior/Lateral T wave inversions/flattening    ECHO, US:    RADIOLOGY:  MRI brain w/o:  Focus of acute ischemic change in the right posterior central gyrus.

## 2019-10-28 NOTE — DISCHARGE NOTE NURSING/CASE MANAGEMENT/SOCIAL WORK - NSDCPEPTSTRK_GEN_ALL_CORE
Signs and symptoms of stroke/Stroke support groups for patients, families, and friends/Stroke education booklet/Need for follow up after discharge/Prescribed medications/Call 911 for stroke/Risk factors for stroke/Stroke warning signs and symptoms

## 2019-10-29 ENCOUNTER — INBOUND DOCUMENT (OUTPATIENT)
Age: 74
End: 2019-10-29

## 2019-10-29 PROBLEM — Z00.00 ENCOUNTER FOR PREVENTIVE HEALTH EXAMINATION: Status: ACTIVE | Noted: 2019-10-29

## 2019-10-29 LAB
AT III ACT/NOR PPP CHRO: 95 % — SIGNIFICANT CHANGE UP (ref 85–135)
CARDIOLIPIN AB SER-ACNC: NEGATIVE — SIGNIFICANT CHANGE UP
HCYS SERPL-MCNC: 16.6 UMOL/L — HIGH
PROT C ACT/NOR PPP: 114 % — SIGNIFICANT CHANGE UP (ref 74–150)

## 2019-10-30 LAB — B2 GLYCOPROT1 AB SER QL: NEGATIVE — SIGNIFICANT CHANGE UP

## 2019-10-31 DIAGNOSIS — N40.0 BENIGN PROSTATIC HYPERPLASIA WITHOUT LOWER URINARY TRACT SYMPTOMS: ICD-10-CM

## 2019-10-31 DIAGNOSIS — E66.9 OBESITY, UNSPECIFIED: ICD-10-CM

## 2019-10-31 DIAGNOSIS — K21.9 GASTRO-ESOPHAGEAL REFLUX DISEASE WITHOUT ESOPHAGITIS: ICD-10-CM

## 2019-10-31 DIAGNOSIS — I12.9 HYPERTENSIVE CHRONIC KIDNEY DISEASE WITH STAGE 1 THROUGH STAGE 4 CHRONIC KIDNEY DISEASE, OR UNSPECIFIED CHRONIC KIDNEY DISEASE: ICD-10-CM

## 2019-10-31 DIAGNOSIS — R73.03 PREDIABETES: ICD-10-CM

## 2019-10-31 DIAGNOSIS — I63.9 CEREBRAL INFARCTION, UNSPECIFIED: ICD-10-CM

## 2019-10-31 DIAGNOSIS — M47.892 OTHER SPONDYLOSIS, CERVICAL REGION: ICD-10-CM

## 2019-10-31 DIAGNOSIS — Z88.2 ALLERGY STATUS TO SULFONAMIDES: ICD-10-CM

## 2019-10-31 DIAGNOSIS — N18.9 CHRONIC KIDNEY DISEASE, UNSPECIFIED: ICD-10-CM

## 2019-10-31 DIAGNOSIS — R94.31 ABNORMAL ELECTROCARDIOGRAM [ECG] [EKG]: ICD-10-CM

## 2019-10-31 DIAGNOSIS — R20.0 ANESTHESIA OF SKIN: ICD-10-CM

## 2019-11-01 ENCOUNTER — INBOUND DOCUMENT (OUTPATIENT)
Age: 74
End: 2019-11-01

## 2019-11-01 LAB — APCR PPP: 3.24 RATIO — SIGNIFICANT CHANGE UP

## 2019-11-02 LAB — PROT S FREE PPP-ACNC: 131 % NORMAL — SIGNIFICANT CHANGE UP (ref 70–150)

## 2019-11-12 PROCEDURE — 86803 HEPATITIS C AB TEST: CPT

## 2019-11-12 PROCEDURE — 0042T: CPT

## 2019-11-12 PROCEDURE — 93970 EXTREMITY STUDY: CPT

## 2019-11-12 PROCEDURE — 70498 CT ANGIOGRAPHY NECK: CPT

## 2019-11-12 PROCEDURE — 85025 COMPLETE CBC W/AUTO DIFF WBC: CPT

## 2019-11-12 PROCEDURE — 80053 COMPREHEN METABOLIC PANEL: CPT

## 2019-11-12 PROCEDURE — 81001 URINALYSIS AUTO W/SCOPE: CPT

## 2019-11-12 PROCEDURE — 99291 CRITICAL CARE FIRST HOUR: CPT | Mod: 25

## 2019-11-12 PROCEDURE — 70450 CT HEAD/BRAIN W/O DYE: CPT

## 2019-11-12 PROCEDURE — 80048 BASIC METABOLIC PNL TOTAL CA: CPT

## 2019-11-12 PROCEDURE — 70551 MRI BRAIN STEM W/O DYE: CPT

## 2019-11-12 PROCEDURE — 83090 ASSAY OF HOMOCYSTEINE: CPT

## 2019-11-12 PROCEDURE — 80061 LIPID PANEL: CPT

## 2019-11-12 PROCEDURE — 93312 ECHO TRANSESOPHAGEAL: CPT

## 2019-11-12 PROCEDURE — 84484 ASSAY OF TROPONIN QUANT: CPT

## 2019-11-12 PROCEDURE — 70496 CT ANGIOGRAPHY HEAD: CPT

## 2019-11-12 PROCEDURE — 85300 ANTITHROMBIN III ACTIVITY: CPT

## 2019-11-12 PROCEDURE — 90686 IIV4 VACC NO PRSV 0.5 ML IM: CPT

## 2019-11-12 PROCEDURE — 85027 COMPLETE CBC AUTOMATED: CPT

## 2019-11-12 PROCEDURE — 85610 PROTHROMBIN TIME: CPT

## 2019-11-12 PROCEDURE — 85307 ASSAY ACTIVATED PROTEIN C: CPT

## 2019-11-12 PROCEDURE — 83036 HEMOGLOBIN GLYCOSYLATED A1C: CPT

## 2019-11-12 PROCEDURE — 86147 CARDIOLIPIN ANTIBODY EA IG: CPT

## 2019-11-12 PROCEDURE — 97161 PT EVAL LOW COMPLEX 20 MIN: CPT

## 2019-11-12 PROCEDURE — 85730 THROMBOPLASTIN TIME PARTIAL: CPT

## 2019-11-12 PROCEDURE — 86146 BETA-2 GLYCOPROTEIN ANTIBODY: CPT

## 2019-11-12 PROCEDURE — 82962 GLUCOSE BLOOD TEST: CPT

## 2019-11-12 PROCEDURE — 84443 ASSAY THYROID STIM HORMONE: CPT

## 2019-11-12 PROCEDURE — 85303 CLOT INHIBIT PROT C ACTIVITY: CPT

## 2019-11-12 PROCEDURE — 36415 COLL VENOUS BLD VENIPUNCTURE: CPT

## 2019-11-12 PROCEDURE — 85306 CLOT INHIBIT PROT S FREE: CPT

## 2019-11-12 PROCEDURE — 83735 ASSAY OF MAGNESIUM: CPT

## 2019-11-12 PROCEDURE — 93005 ELECTROCARDIOGRAM TRACING: CPT

## 2020-10-08 NOTE — ED ADULT NURSE NOTE - NSFALLRSKASSESASSIST_ED_ALL_ED
Called and lm on vm--asking status of ENT visit.  -if stitches need to go to urgent care--can call back if visit needed. no

## 2024-04-07 NOTE — PHYSICAL THERAPY INITIAL EVALUATION ADULT - RANGE OF MOTION EXAMINATION, REHAB EVAL
Trung presents to infusion for Q4week therapeutic phlebotomy. Patient reports feeling less fatigued after last TP, tolerated past TP well.     PIV started with positive blood return and labs drawn off of IV start.     Labs reviewed by RN, Hgb:19.7, within parameters for treatment today.    500 ml blood removed over 10 minutes and patient observed 10 minutes post phlebotomy, patient tolerated well with no adverse effects, orthostatic VSS.    PIV d/rafat with tip intact. Patient left in stable condition, knows when to return for next appt.    bilateral upper extremity ROM was WNL (within normal limits)/bilateral lower extremity was ROM was WNL (within normal limits)